# Patient Record
Sex: FEMALE | Race: WHITE | HISPANIC OR LATINO | URBAN - METROPOLITAN AREA
[De-identification: names, ages, dates, MRNs, and addresses within clinical notes are randomized per-mention and may not be internally consistent; named-entity substitution may affect disease eponyms.]

---

## 2022-07-20 ENCOUNTER — LAB OUTSIDE AN ENCOUNTER (OUTPATIENT)
Dept: URBAN - METROPOLITAN AREA CLINIC 105 | Facility: CLINIC | Age: 52
End: 2022-07-20

## 2022-07-20 ENCOUNTER — WEB ENCOUNTER (OUTPATIENT)
Dept: URBAN - METROPOLITAN AREA CLINIC 105 | Facility: CLINIC | Age: 52
End: 2022-07-20

## 2022-07-20 ENCOUNTER — OFFICE VISIT (OUTPATIENT)
Dept: URBAN - METROPOLITAN AREA CLINIC 105 | Facility: CLINIC | Age: 52
End: 2022-07-20
Payer: COMMERCIAL

## 2022-07-20 VITALS
SYSTOLIC BLOOD PRESSURE: 134 MMHG | WEIGHT: 209.8 LBS | HEART RATE: 82 BPM | BODY MASS INDEX: 41.19 KG/M2 | DIASTOLIC BLOOD PRESSURE: 84 MMHG | TEMPERATURE: 97.9 F | HEIGHT: 60 IN

## 2022-07-20 DIAGNOSIS — K62.5 RECTAL BLEEDING: ICD-10-CM

## 2022-07-20 DIAGNOSIS — R19.7 DIARRHEA, UNSPECIFIED TYPE: ICD-10-CM

## 2022-07-20 DIAGNOSIS — R15.9 INCONTINENCE OF FECES, UNSPECIFIED FECAL INCONTINENCE TYPE: ICD-10-CM

## 2022-07-20 DIAGNOSIS — K44.9 HIATAL HERNIA: ICD-10-CM

## 2022-07-20 DIAGNOSIS — R10.84 GENERALIZED ABDOMINAL PAIN: ICD-10-CM

## 2022-07-20 DIAGNOSIS — Z87.11 HISTORY OF GASTRIC ULCER: ICD-10-CM

## 2022-07-20 DIAGNOSIS — E66.01 MORBID OBESITY: ICD-10-CM

## 2022-07-20 PROCEDURE — 99214 OFFICE O/P EST MOD 30 MIN: CPT | Performed by: INTERNAL MEDICINE

## 2022-07-20 NOTE — HPI-TODAY'S VISIT:
The patient presents for rectal bleeding.  On 11/14/19, the patient presented for fecal incontinence and rectal bleeding. Onset was 13 years after she had her baby. She said her baby was big and she was not supposed to give birth vaginally, but ended up doing that. She said the doctor did not suture up her vaginal region properly, so she started to experience incontinence and rectal bleeding. She reported that her incontinence could be painful. In 2018, a vaginal repair (Dr. Cristopher Landeros in [place]) was done, which improved her urinary incontinence.  She noted fecal incontinence 4x/week. For example, when she walked, she could feel rectal burning and stool/blood came out. Stool mainly came out, but occasionally blood came out.   She had 3-6 BMs/day. She noted no formation and it was soft on Miralax cap 4x/week. She had a BM after she ate. She had watery diarrhea yesterday and the day before. She got watery diarrhea 2x/month. She had a history of a HH, a bleeding gastric ulcer, IBS, alternating bouts of diarrhea and constipation, and hemorrhoids all since the age of 16.   She previously had repeated UTIs. She had 4-5 UTIs/year. She had seen a GI previously for her ulcers. She said her bladder had been raised.  She was seen in 2009 by Dr. Douglass for rectal bleeding/constipation/bloating.  Colonoscopy 3 years prior was noted by Dr. Douglass and was normal.  Hydrocortisone supp, Align, and Colace recommended with a 4 week follow-up.  She did not follow-up.  HPI: Today, the patient says she had vaginal reconstructive surgery 7 years ago due to uterine prolapse as a result of a vaginal delivery 16 years prior. She has had issues with urinary incontinence and currently has fecal incontinence and hemorrhoidal symptoms. She has rectal bleeding, fecal urgency, pain at the rectum, abdominal pain. Constipation is not as often as diarrhea. Sometimes she feels like something is "scratching" her intestines. After her last appt in 2019, she no longer had insurance, but is now covered - EGD and Colonoscopy ordered at that time along with labs.    She saw Dr. Gannon on 11/21/19 for fecal incontinence.  He noted first degree hemorrhoids on anoscopy.  Her ordered an anorectal manometry and anal US.  He discussed with her possible therapy including PT/biofeedback, sacral nerve stimulator, or sphincteroplasty.  She notes fiber supplements worsened her constipation previously.

## 2022-07-21 LAB
A/G RATIO: 1.6
ABSOLUTE BASOPHILS: 18
ABSOLUTE EOSINOPHILS: 100
ABSOLUTE LYMPHOCYTES: 2018
ABSOLUTE MONOCYTES: 378
ABSOLUTE NEUTROPHILS: 3387
ALBUMIN: 4.1
ALKALINE PHOSPHATASE: 89
ALT (SGPT): 27
AST (SGOT): 21
BASOPHILS: 0.3
BILIRUBIN, TOTAL: 0.3
BUN/CREATININE RATIO: (no result)
BUN: 15
CALCIUM: 9.5
CARBON DIOXIDE, TOTAL: 33
CHLORIDE: 101
CREATININE: 0.76
EGFR: 95
EOSINOPHILS: 1.7
GLOBULIN, TOTAL: 2.5
GLUCOSE: 98
HEMATOCRIT: 39.8
HEMOGLOBIN: 13.3
LYMPHOCYTES: 34.2
MCH: 30.4
MCHC: 33.4
MCV: 90.9
MONOCYTES: 6.4
MPV: 11.5
NEUTROPHILS: 57.4
PLATELET COUNT: 189
POTASSIUM: 4
PROTEIN, TOTAL: 6.6
RDW: 12.5
RED BLOOD CELL COUNT: 4.38
SODIUM: 141
TSH W/REFLEX TO FT4: 2.2
WHITE BLOOD CELL COUNT: 5.9

## 2022-07-23 PROBLEM — 162864005: Status: ACTIVE | Noted: 2022-07-20

## 2022-08-16 ENCOUNTER — TELEPHONE ENCOUNTER (OUTPATIENT)
Dept: URBAN - METROPOLITAN AREA CLINIC 105 | Facility: CLINIC | Age: 52
End: 2022-08-16

## 2022-09-06 ENCOUNTER — OFFICE VISIT (OUTPATIENT)
Dept: URBAN - METROPOLITAN AREA SURGERY CENTER 13 | Facility: SURGERY CENTER | Age: 52
End: 2022-09-06

## 2022-09-06 ENCOUNTER — OFFICE VISIT (OUTPATIENT)
Dept: URBAN - METROPOLITAN AREA MEDICAL CENTER 33 | Facility: MEDICAL CENTER | Age: 52
End: 2022-09-06

## 2022-09-13 ENCOUNTER — OFFICE VISIT (OUTPATIENT)
Dept: URBAN - METROPOLITAN AREA SURGERY CENTER 31 | Facility: SURGERY CENTER | Age: 52
End: 2022-09-13
Payer: COMMERCIAL

## 2022-09-13 ENCOUNTER — TELEPHONE ENCOUNTER (OUTPATIENT)
Dept: URBAN - METROPOLITAN AREA CLINIC 105 | Facility: CLINIC | Age: 52
End: 2022-09-13

## 2022-09-13 ENCOUNTER — OFFICE VISIT (OUTPATIENT)
Dept: URBAN - METROPOLITAN AREA SURGERY CENTER 13 | Facility: SURGERY CENTER | Age: 52
End: 2022-09-13

## 2022-09-13 ENCOUNTER — TELEPHONE ENCOUNTER (OUTPATIENT)
Dept: URBAN - METROPOLITAN AREA CLINIC 128 | Facility: CLINIC | Age: 52
End: 2022-09-13

## 2022-09-13 ENCOUNTER — CLAIMS CREATED FROM THE CLAIM WINDOW (OUTPATIENT)
Dept: URBAN - METROPOLITAN AREA CLINIC 4 | Facility: CLINIC | Age: 52
End: 2022-09-13
Payer: COMMERCIAL

## 2022-09-13 DIAGNOSIS — K22.89 DILATATION OF ESOPHAGUS: ICD-10-CM

## 2022-09-13 DIAGNOSIS — R19.7 ACUTE DIARRHEA: ICD-10-CM

## 2022-09-13 DIAGNOSIS — K92.1 ACUTE MELENA: ICD-10-CM

## 2022-09-13 DIAGNOSIS — K31.A11 GASTRIC INTESTINAL METAPLASIA WITHOUT DYSPLASIA, INVOLVING THE ANTRUM: ICD-10-CM

## 2022-09-13 DIAGNOSIS — K31.89 OTHER DISEASES OF STOMACH AND DUODENUM: ICD-10-CM

## 2022-09-13 DIAGNOSIS — K63.5 BENIGN COLON POLYP: ICD-10-CM

## 2022-09-13 DIAGNOSIS — R10.84 ABDOMINAL CRAMPING, GENERALIZED: ICD-10-CM

## 2022-09-13 DIAGNOSIS — K63.89 OTHER SPECIFIED DISEASES OF INTESTINE: ICD-10-CM

## 2022-09-13 DIAGNOSIS — K51.40 INFLAMMATORY POLYPS OF COLON WITHOUT COMPLICATIONS: ICD-10-CM

## 2022-09-13 PROCEDURE — 45385 COLONOSCOPY W/LESION REMOVAL: CPT | Performed by: INTERNAL MEDICINE

## 2022-09-13 PROCEDURE — G8907 PT DOC NO EVENTS ON DISCHARG: HCPCS | Performed by: INTERNAL MEDICINE

## 2022-09-13 PROCEDURE — 88342 IMHCHEM/IMCYTCHM 1ST ANTB: CPT | Performed by: PATHOLOGY

## 2022-09-13 PROCEDURE — 43239 EGD BIOPSY SINGLE/MULTIPLE: CPT | Performed by: INTERNAL MEDICINE

## 2022-09-13 PROCEDURE — 88312 SPECIAL STAINS GROUP 1: CPT | Performed by: PATHOLOGY

## 2022-09-13 PROCEDURE — 45380 COLONOSCOPY AND BIOPSY: CPT | Performed by: INTERNAL MEDICINE

## 2022-09-13 PROCEDURE — 88305 TISSUE EXAM BY PATHOLOGIST: CPT | Performed by: PATHOLOGY

## 2022-09-16 ENCOUNTER — TELEPHONE ENCOUNTER (OUTPATIENT)
Dept: URBAN - METROPOLITAN AREA CLINIC 105 | Facility: CLINIC | Age: 52
End: 2022-09-16

## 2022-09-16 ENCOUNTER — TELEPHONE ENCOUNTER (OUTPATIENT)
Dept: URBAN - METROPOLITAN AREA CLINIC 128 | Facility: CLINIC | Age: 52
End: 2022-09-16

## 2022-09-16 RX ORDER — HYOSCYAMINE SULFATE 0.12 MG/1
1 TABLET TABLET SUBLINGUAL
Qty: 30 | Refills: 2 | OUTPATIENT
Start: 2022-09-16 | End: 2022-12-14

## 2022-09-21 ENCOUNTER — OFFICE VISIT (OUTPATIENT)
Dept: URBAN - METROPOLITAN AREA CLINIC 105 | Facility: CLINIC | Age: 52
End: 2022-09-21
Payer: COMMERCIAL

## 2022-09-21 VITALS
DIASTOLIC BLOOD PRESSURE: 80 MMHG | HEIGHT: 60 IN | WEIGHT: 212.8 LBS | TEMPERATURE: 97.3 F | BODY MASS INDEX: 41.78 KG/M2 | SYSTOLIC BLOOD PRESSURE: 136 MMHG | HEART RATE: 74 BPM

## 2022-09-21 DIAGNOSIS — K29.00 ACUTE SUPERFICIAL GASTRITIS WITHOUT HEMORRHAGE: ICD-10-CM

## 2022-09-21 DIAGNOSIS — R15.9 INCONTINENCE OF FECES, UNSPECIFIED FECAL INCONTINENCE TYPE: ICD-10-CM

## 2022-09-21 DIAGNOSIS — K44.9 HIATAL HERNIA: ICD-10-CM

## 2022-09-21 DIAGNOSIS — R10.2 SUPRAPUBIC ABDOMINAL PAIN: ICD-10-CM

## 2022-09-21 DIAGNOSIS — Z87.11 HISTORY OF GASTRIC ULCER: ICD-10-CM

## 2022-09-21 DIAGNOSIS — K22.9 NODULE OF ESOPHAGUS: ICD-10-CM

## 2022-09-21 DIAGNOSIS — R10.84 GENERALIZED ABDOMINAL PAIN: ICD-10-CM

## 2022-09-21 DIAGNOSIS — R19.7 DIARRHEA, UNSPECIFIED TYPE: ICD-10-CM

## 2022-09-21 DIAGNOSIS — E66.01 MORBID OBESITY: ICD-10-CM

## 2022-09-21 DIAGNOSIS — K64.8 INTERNAL BLEEDING HEMORRHOIDS: ICD-10-CM

## 2022-09-21 PROCEDURE — 99214 OFFICE O/P EST MOD 30 MIN: CPT | Performed by: INTERNAL MEDICINE

## 2022-09-21 RX ORDER — OMEPRAZOLE 40 MG/1
1 CAPSULE 30 MINUTES BEFORE MORNING MEAL CAPSULE, DELAYED RELEASE ORAL ONCE A DAY
Qty: 30 | Refills: 2 | OUTPATIENT
Start: 2022-09-21

## 2022-09-21 RX ORDER — HYOSCYAMINE SULFATE 0.12 MG/1
1 TABLET TABLET SUBLINGUAL
Qty: 30 | Refills: 2 | Status: ON HOLD | COMMUNITY
Start: 2022-09-16 | End: 2022-12-14

## 2022-09-21 NOTE — HPI-TODAY'S VISIT:
The patient presents for rectal bleeding.  On 11/14/19, the patient presented for fecal incontinence and rectal bleeding. Onset was 13 years after she had her baby. She said her baby was big and she was not supposed to give birth vaginally, but ended up doing that. She said the doctor did not suture up her vaginal region properly, so she started to experience incontinence and rectal bleeding. She reported that her incontinence could be painful. In 2018, a vaginal repair (Dr. Cristopher Landeros in [place]) was done, which improved her urinary incontinence.  She noted fecal incontinence 4x/week. For example, when she walked, she could feel rectal burning and stool/blood came out. Stool mainly came out, but occasionally blood came out.   She had 3-6 BMs/day. She noted no formation and it was soft on Miralax cap 4x/week. She had a BM after she ate. She had watery diarrhea yesterday and the day before. She got watery diarrhea 2x/month. She had a history of a HH, a bleeding gastric ulcer, IBS, alternating bouts of diarrhea and constipation, and hemorrhoids all since the age of 16.   She previously had repeated UTIs. She had 4-5 UTIs/year. She had seen a GI previously for her ulcers. She said her bladder had been raised.  She was seen in 2009 by Dr. Douglass for rectal bleeding/constipation/bloating.  Colonoscopy 3 years prior was noted by Dr. Douglass and was normal.  Hydrocortisone supp, Align, and Colace recommended with a 4 week follow-up.  She did not follow-up.  On 7/20/22, the patient said she had vaginal reconstructive surgery 7 years ago due to uterine prolapse as a result of a vaginal delivery 16 years prior. She had issues with urinary incontinence and currently had fecal incontinence and hemorrhoidal symptoms. She had rectal bleeding, fecal urgency, pain at the rectum, abdominal pain. Constipation was not as often as diarrhea. Sometimes she felt like something was "scratching" her intestines. After her last appt in 2019, she no longer had insurance, but was now covered - EGD and Colonoscopy ordered at that time along with labs.    She saw Dr. Gannon on 11/21/19 for fecal incontinence.  He noted first degree hemorrhoids on anoscopy.  He ordered an anorectal manometry and anal US.  He discussed with her possible therapy including PT/biofeedback, sacral nerve stimulator, or sphincteroplasty.  She noted fiber supplements worsened her constipation previously.  HPI: Today, she says she started to have severe bilateral suprapubic pain 20 hours after her EGD/colon that lasted 3-4 days. Pain is now less severe - has it 6x/day and lasts minutes. She feels like she has gastritis. She last took hyoscyamine 2 days ago which helped. She has 5 BMs QD - not taking anything for her bowel habit. She felt a burning sensation while urinating - labs with PCP were negative. She is unsure if her  has made the appt with Dr. Gannon.  Labs 7/20/22 - CBC, CMP, TSH/FT4 all normal.

## 2022-09-27 ENCOUNTER — OFFICE VISIT (OUTPATIENT)
Dept: URBAN - METROPOLITAN AREA MEDICAL CENTER 33 | Facility: MEDICAL CENTER | Age: 52
End: 2022-09-27

## 2022-10-12 ENCOUNTER — TELEPHONE ENCOUNTER (OUTPATIENT)
Dept: URBAN - METROPOLITAN AREA CLINIC 105 | Facility: CLINIC | Age: 52
End: 2022-10-12

## 2022-10-31 ENCOUNTER — OFFICE VISIT (OUTPATIENT)
Dept: URBAN - METROPOLITAN AREA SURGERY CENTER 31 | Facility: SURGERY CENTER | Age: 52
End: 2022-10-31
Payer: COMMERCIAL

## 2022-10-31 ENCOUNTER — CLAIMS CREATED FROM THE CLAIM WINDOW (OUTPATIENT)
Dept: URBAN - METROPOLITAN AREA CLINIC 4 | Facility: CLINIC | Age: 52
End: 2022-10-31
Payer: COMMERCIAL

## 2022-10-31 DIAGNOSIS — K31.89 OTHER DISEASES OF STOMACH AND DUODENUM: ICD-10-CM

## 2022-10-31 DIAGNOSIS — K31.89 ACQUIRED DEFORMITY OF DUODENUM: ICD-10-CM

## 2022-10-31 DIAGNOSIS — K21.9 ACID REFLUX: ICD-10-CM

## 2022-10-31 PROCEDURE — 43239 EGD BIOPSY SINGLE/MULTIPLE: CPT | Performed by: INTERNAL MEDICINE

## 2022-10-31 PROCEDURE — 88305 TISSUE EXAM BY PATHOLOGIST: CPT | Performed by: PATHOLOGY

## 2022-10-31 PROCEDURE — 88312 SPECIAL STAINS GROUP 1: CPT | Performed by: PATHOLOGY

## 2022-10-31 PROCEDURE — G8907 PT DOC NO EVENTS ON DISCHARG: HCPCS | Performed by: INTERNAL MEDICINE

## 2022-10-31 RX ORDER — OMEPRAZOLE 40 MG/1
1 CAPSULE 30 MINUTES BEFORE MORNING MEAL CAPSULE, DELAYED RELEASE ORAL ONCE A DAY
Qty: 30 | Refills: 2 | Status: ACTIVE | COMMUNITY
Start: 2022-09-21

## 2022-10-31 RX ORDER — OMEPRAZOLE 40 MG/1
1 CAPSULE 30 MINUTES BEFORE MORNING MEAL CAPSULE, DELAYED RELEASE ORAL ONCE A DAY
Qty: 90 CAPSULE | Refills: 3
Start: 2022-09-21

## 2022-10-31 RX ORDER — HYOSCYAMINE SULFATE 0.12 MG/1
1 TABLET TABLET SUBLINGUAL
Qty: 30 | Refills: 2 | Status: ON HOLD | COMMUNITY
Start: 2022-09-16 | End: 2022-12-14

## 2022-11-14 ENCOUNTER — OFFICE VISIT (OUTPATIENT)
Dept: URBAN - METROPOLITAN AREA SURGERY CENTER 31 | Facility: SURGERY CENTER | Age: 52
End: 2022-11-14

## 2022-12-20 ENCOUNTER — OFFICE VISIT (OUTPATIENT)
Dept: URBAN - METROPOLITAN AREA CLINIC 105 | Facility: CLINIC | Age: 52
End: 2022-12-20
Payer: COMMERCIAL

## 2022-12-20 VITALS
DIASTOLIC BLOOD PRESSURE: 85 MMHG | TEMPERATURE: 97.5 F | HEIGHT: 60 IN | SYSTOLIC BLOOD PRESSURE: 124 MMHG | BODY MASS INDEX: 42.01 KG/M2 | HEART RATE: 80 BPM | WEIGHT: 214 LBS

## 2022-12-20 DIAGNOSIS — R15.9 INCONTINENCE OF FECES, UNSPECIFIED FECAL INCONTINENCE TYPE: ICD-10-CM

## 2022-12-20 DIAGNOSIS — R19.7 DIARRHEA, UNSPECIFIED TYPE: ICD-10-CM

## 2022-12-20 DIAGNOSIS — K29.00 ACUTE SUPERFICIAL GASTRITIS WITHOUT HEMORRHAGE: ICD-10-CM

## 2022-12-20 DIAGNOSIS — K21.00 GASTROESOPHAGEAL REFLUX DISEASE WITH ESOPHAGITIS WITHOUT HEMORRHAGE: ICD-10-CM

## 2022-12-20 DIAGNOSIS — K44.9 HIATAL HERNIA: ICD-10-CM

## 2022-12-20 DIAGNOSIS — E66.01 MORBID OBESITY: ICD-10-CM

## 2022-12-20 DIAGNOSIS — K64.8 INTERNAL BLEEDING HEMORRHOIDS: ICD-10-CM

## 2022-12-20 DIAGNOSIS — R10.84 GENERALIZED ABDOMINAL PAIN: ICD-10-CM

## 2022-12-20 DIAGNOSIS — Z87.11 HISTORY OF GASTRIC ULCER: ICD-10-CM

## 2022-12-20 DIAGNOSIS — R10.2 SUPRAPUBIC ABDOMINAL PAIN: ICD-10-CM

## 2022-12-20 PROCEDURE — 99214 OFFICE O/P EST MOD 30 MIN: CPT | Performed by: INTERNAL MEDICINE

## 2022-12-20 RX ORDER — AMITRIPTYLINE HYDROCHLORIDE 10 MG/1
1 TABLET TABLET, FILM COATED ORAL
Qty: 30 | Refills: 2 | OUTPATIENT
Start: 2022-12-20

## 2022-12-20 RX ORDER — OMEPRAZOLE 40 MG/1
1 CAPSULE CAPSULE, DELAYED RELEASE ORAL
Qty: 30 | Refills: 5 | OUTPATIENT
Start: 2022-09-21

## 2022-12-20 RX ORDER — HYOSCYAMINE SULFATE 0.12 MG/1
1 TABLET TABLET SUBLINGUAL
Qty: 30 | Refills: 2 | OUTPATIENT
Start: 2022-12-20 | End: 2023-03-20

## 2022-12-20 RX ORDER — OMEPRAZOLE 40 MG/1
1 CAPSULE 30 MINUTES BEFORE MORNING MEAL CAPSULE, DELAYED RELEASE ORAL ONCE A DAY
Qty: 90 CAPSULE | Refills: 3 | Status: ACTIVE | COMMUNITY
Start: 2022-09-21

## 2022-12-20 NOTE — HPI-TODAY'S VISIT:
The patient presents for rectal bleeding.  On 11/14/19, the patient presented for fecal incontinence and rectal bleeding. Onset was 13 years after she had her baby. She said her baby was big and she was not supposed to give birth vaginally, but ended up doing that. She said the doctor did not suture up her vaginal region properly, so she started to experience incontinence and rectal bleeding. She reported that her incontinence could be painful. In 2018, a vaginal repair (Dr. Cristopher Landeros in [place]) was done, which improved her urinary incontinence.  She noted fecal incontinence 4x/week. For example, when she walked, she could feel rectal burning and stool/blood came out. Stool mainly came out, but occasionally blood came out.   She had 3-6 BMs/day. She noted no formation and it was soft on Miralax cap 4x/week. She had a BM after she ate. She had watery diarrhea yesterday and the day before. She got watery diarrhea 2x/month. She had a history of a HH, a bleeding gastric ulcer, IBS, alternating bouts of diarrhea and constipation, and hemorrhoids all since the age of 16.   She previously had repeated UTIs. She had 4-5 UTIs/year. She had seen a GI previously for her ulcers. She said her bladder had been raised.  She was seen in 2009 by Dr. Douglass for rectal bleeding/constipation/bloating.  Colonoscopy 3 years prior was noted by Dr. Douglass and was normal.  Hydrocortisone supp, Align, and Colace recommended with a 4 week follow-up.  She did not follow-up.  On 7/20/22, the patient said she had vaginal reconstructive surgery 7 years ago due to uterine prolapse as a result of a vaginal delivery 16 years prior. She had issues with urinary incontinence and currently had fecal incontinence and hemorrhoidal symptoms. She had rectal bleeding, fecal urgency, pain at the rectum, abdominal pain. Constipation was not as often as diarrhea. Sometimes she felt like something was "scratching" her intestines. After her last appt in 2019, she no longer had insurance, but was now covered - EGD and Colonoscopy ordered at that time along with labs.    She saw Dr. Gannon on 11/21/19 for fecal incontinence.  He noted first degree hemorrhoids on anoscopy.  He ordered an anorectal manometry and anal US.  He discussed with her possible therapy including PT/biofeedback, sacral nerve stimulator, or sphincteroplasty.  She noted fiber supplements worsened her constipation previously.  On 9/21/22, she said she started to have severe bilateral suprapubic pain 20 hours after her EGD/colon that lasted 3-4 days. Pain was now less severe - had it 6x/day and lasted minutes. She felt like she had gastritis. She last took hyoscyamine 2 days ago which helped. She had 5 BMs QD - not taking anything for her bowel habit. She felt a burning sensation while urinating - labs with PCP were negative. She was unsure if her  had made the appt with Dr. Gannon.  HPI: Today, she says she started on omeprazole, but d/c - was taking abx which she has also stopped taking. She is not having much abdominal pain, only leg/feet pain. She took hyoscyamine a few times. She notes urgency with a BM.  Labs 7/20/22 - CBC, CMP, TSH/FT4 all normal.

## 2022-12-21 ENCOUNTER — DASHBOARD ENCOUNTERS (OUTPATIENT)
Age: 52
End: 2022-12-21

## 2022-12-21 PROBLEM — 72042002: Status: ACTIVE | Noted: 2022-07-20

## 2022-12-21 PROBLEM — 238136002: Status: ACTIVE | Noted: 2022-07-23

## 2022-12-21 PROBLEM — 266433003: Status: ACTIVE | Noted: 2022-12-20

## 2023-02-22 ENCOUNTER — OFFICE VISIT (OUTPATIENT)
Dept: URBAN - METROPOLITAN AREA CLINIC 105 | Facility: CLINIC | Age: 53
End: 2023-02-22

## 2023-03-14 ENCOUNTER — APPOINTMENT (RX ONLY)
Dept: URBAN - METROPOLITAN AREA CLINIC 44 | Facility: CLINIC | Age: 53
Setting detail: DERMATOLOGY
End: 2023-03-14

## 2023-03-14 DIAGNOSIS — S0032XA BLISTER OF FACE, NECK, AND SCALP EXCEPT EYE, WITHOUT MENTION OF INFECTION: ICD-10-CM

## 2023-03-14 DIAGNOSIS — S0002XA BLISTER OF FACE, NECK, AND SCALP EXCEPT EYE, WITHOUT MENTION OF INFECTION: ICD-10-CM

## 2023-03-14 DIAGNOSIS — S00429A BLISTER OF FACE, NECK, AND SCALP EXCEPT EYE, WITHOUT MENTION OF INFECTION: ICD-10-CM

## 2023-03-14 DIAGNOSIS — S00522A BLISTER OF FACE, NECK, AND SCALP EXCEPT EYE, WITHOUT MENTION OF INFECTION: ICD-10-CM

## 2023-03-14 DIAGNOSIS — S1092XA BLISTER OF FACE, NECK, AND SCALP EXCEPT EYE, WITHOUT MENTION OF INFECTION: ICD-10-CM

## 2023-03-14 DIAGNOSIS — S00521A BLISTER OF FACE, NECK, AND SCALP EXCEPT EYE, WITHOUT MENTION OF INFECTION: ICD-10-CM

## 2023-03-14 DIAGNOSIS — S0092XA BLISTER OF FACE, NECK, AND SCALP EXCEPT EYE, WITHOUT MENTION OF INFECTION: ICD-10-CM

## 2023-03-14 DIAGNOSIS — B07.8 OTHER VIRAL WARTS: ICD-10-CM

## 2023-03-14 DIAGNOSIS — S1012XA BLISTER OF FACE, NECK, AND SCALP EXCEPT EYE, WITHOUT MENTION OF INFECTION: ICD-10-CM

## 2023-03-14 PROBLEM — S40.822A BLISTER (NONTHERMAL) OF LEFT UPPER ARM, INITIAL ENCOUNTER: Status: ACTIVE | Noted: 2023-03-14

## 2023-03-14 PROCEDURE — ? COUNSELING

## 2023-03-14 PROCEDURE — ? MEDICATION COUNSELING

## 2023-03-14 PROCEDURE — ? ADDITIONAL NOTES

## 2023-03-14 PROCEDURE — ? LIQUID NITROGEN

## 2023-03-14 PROCEDURE — ? PRESCRIPTION MEDICATION MANAGEMENT

## 2023-03-14 PROCEDURE — 17110 DESTRUCTION B9 LES UP TO 14: CPT

## 2023-03-14 PROCEDURE — ? PRESCRIPTION

## 2023-03-14 PROCEDURE — 99202 OFFICE O/P NEW SF 15 MIN: CPT | Mod: 25

## 2023-03-14 RX ORDER — PHARMACY COMPOUNDING ACCESSORY
EACH MISCELLANEOUS QHS
Qty: 35 | Refills: 1 | Status: ERX | COMMUNITY
Start: 2023-03-14

## 2023-03-14 RX ADMIN — Medication: at 00:00

## 2023-03-14 ASSESSMENT — LOCATION DETAILED DESCRIPTION DERM
LOCATION DETAILED: LEFT LATERAL PROXIMAL UPPER ARM
LOCATION DETAILED: LEFT LATERAL PROXIMAL UPPER ARM
LOCATION DETAILED: RIGHT DISTAL VENTRAL THUMB
LOCATION DETAILED: RIGHT DISTAL RADIAL THUMB

## 2023-03-14 ASSESSMENT — LOCATION SIMPLE DESCRIPTION DERM
LOCATION SIMPLE: RIGHT THUMB
LOCATION SIMPLE: LEFT UPPER ARM
LOCATION SIMPLE: LEFT UPPER ARM

## 2023-03-14 ASSESSMENT — LOCATION ZONE DERM
LOCATION ZONE: ARM
LOCATION ZONE: ARM
LOCATION ZONE: FINGER

## 2023-03-14 NOTE — PROCEDURE: LIQUID NITROGEN
Show Applicator Variable?: Yes
Render Note In Bullet Format When Appropriate: No
Detail Level: Detailed
Consent: The patient's consent was obtained including but not limited to risks of crusting, scabbing, blistering, scarring, darker or lighter pigmentary change, recurrence, incomplete removal and infection.
Spray Paint Text: The liquid nitrogen was applied to the skin utilizing a spray paint frosting technique.
Post-Care Instructions: I reviewed with the patient in detail post-care instructions. Patient is to wear sunprotection, and avoid picking at any of the treated lesions. Pt may apply Vaseline to crusted or scabbing areas.
Medical Necessity Clause: This procedure was medically necessary because the lesions that were treated were: irritating to patient
Medical Necessity Information: It is in your best interest to select a reason for this procedure from the list below. All of these items fulfill various CMS LCD requirements except the new and changing color options.

## 2023-03-14 NOTE — HPI: WART (PATIENT REPORTED)
Where Is Your Wart Located?: Left 1st digit
Additional Comments (Use Complete Sentences): New pt here for wart on left hand and spot on right arm

## 2023-03-14 NOTE — PROCEDURE: MEDICATION COUNSELING
The patient is a 6m4w Female complaining of vomiting. Taltz Counseling: I discussed with the patient the risks of ixekizumab including but not limited to immunosuppression, serious infections, worsening of inflammatory bowel disease and drug reactions.  The patient understands that monitoring is required including a PPD at baseline and must alert us or the primary physician if symptoms of infection or other concerning signs are noted.

## 2023-03-14 NOTE — PROCEDURE: PRESCRIPTION MEDICATION MANAGEMENT
Detail Level: Zone
Initiate Treatment: pharmacy compounding accessory Qhs\\nQuantity: 35.0 ml  Days Supply: 30\\nSig: Fluorouracil 5%, Cimetidine 5%, salicylic Acid 20% cream Apply very small amount qhs to wart on aa of the hands. Cover with a band aid
Render In Strict Bullet Format?: No

## 2023-03-14 NOTE — PROCEDURE: MEDICATION COUNSELING
Xelkyreez Pregnancy And Lactation Text: This medication is Pregnancy Category D and is not considered safe during pregnancy.  The risk during breast feeding is also uncertain.

## 2023-03-29 ENCOUNTER — APPOINTMENT (RX ONLY)
Dept: URBAN - METROPOLITAN AREA CLINIC 44 | Facility: CLINIC | Age: 53
Setting detail: DERMATOLOGY
End: 2023-03-29

## 2023-03-29 DIAGNOSIS — B07.8 OTHER VIRAL WARTS: ICD-10-CM

## 2023-03-29 PROCEDURE — ? PRESCRIPTION MEDICATION MANAGEMENT

## 2023-03-29 PROCEDURE — ? LIQUID NITROGEN

## 2023-03-29 PROCEDURE — ? FULL BODY SKIN EXAM - DECLINED

## 2023-03-29 PROCEDURE — ? ADDITIONAL NOTES

## 2023-03-29 PROCEDURE — ? COUNSELING

## 2023-03-29 PROCEDURE — 17110 DESTRUCTION B9 LES UP TO 14: CPT

## 2023-03-29 ASSESSMENT — LOCATION DETAILED DESCRIPTION DERM
LOCATION DETAILED: RIGHT DISTAL RADIAL THUMB
LOCATION DETAILED: LEFT DISTAL RADIAL PALMAR INDEX FINGER
LOCATION DETAILED: RIGHT DISTAL VENTRAL THUMB

## 2023-03-29 ASSESSMENT — LOCATION SIMPLE DESCRIPTION DERM
LOCATION SIMPLE: LEFT INDEX FINGER
LOCATION SIMPLE: RIGHT THUMB

## 2023-03-29 ASSESSMENT — LOCATION ZONE DERM: LOCATION ZONE: FINGER

## 2023-03-29 NOTE — PROCEDURE: PRESCRIPTION MEDICATION MANAGEMENT
Render In Strict Bullet Format?: No
Continue Regimen: pharmacy compounding accessory Miscellaneous\\nSig: Fluorouracil 5%, Cimetidine 5%, salicylic Acid 20% cream Apply very small amount qhs to wart on aa of the\\nhands. Cover with a band aid\\nQuantity: 35 Milliliter Refills: 1
Detail Level: Zone

## 2023-03-29 NOTE — PROCEDURE: MIPS QUALITY
Yes
Quality 130: Documentation Of Current Medications In The Medical Record: Current Medications Documented
Detail Level: Detailed
Quality 431: Preventive Care And Screening: Unhealthy Alcohol Use - Screening: Patient not identified as an unhealthy alcohol user when screened for unhealthy alcohol use using a systematic screening method
Quality 226: Preventive Care And Screening: Tobacco Use: Screening And Cessation Intervention: Patient screened for tobacco use and is an ex/non-smoker

## 2023-07-26 ENCOUNTER — OFFICE VISIT (OUTPATIENT)
Dept: URBAN - METROPOLITAN AREA CLINIC 105 | Facility: CLINIC | Age: 53
End: 2023-07-26

## 2025-08-26 ENCOUNTER — OFFICE VISIT (OUTPATIENT)
Dept: URBAN - METROPOLITAN AREA CLINIC 105 | Facility: CLINIC | Age: 55
End: 2025-08-26
Payer: COMMERCIAL

## 2025-08-26 ENCOUNTER — LAB OUTSIDE AN ENCOUNTER (OUTPATIENT)
Dept: URBAN - METROPOLITAN AREA CLINIC 105 | Facility: CLINIC | Age: 55
End: 2025-08-26

## 2025-08-26 DIAGNOSIS — R19.7 DIARRHEA, UNSPECIFIED TYPE: ICD-10-CM

## 2025-08-26 DIAGNOSIS — K64.8 INTERNAL BLEEDING HEMORRHOIDS: ICD-10-CM

## 2025-08-26 DIAGNOSIS — R10.84 GENERALIZED ABDOMINAL PAIN: ICD-10-CM

## 2025-08-26 DIAGNOSIS — R41.3 MEMORY LOSS: ICD-10-CM

## 2025-08-26 DIAGNOSIS — K21.00 GASTROESOPHAGEAL REFLUX DISEASE WITH ESOPHAGITIS WITHOUT HEMORRHAGE: ICD-10-CM

## 2025-08-26 DIAGNOSIS — R10.2 SUPRAPUBIC ABDOMINAL PAIN: ICD-10-CM

## 2025-08-26 DIAGNOSIS — K44.9 HIATAL HERNIA: ICD-10-CM

## 2025-08-26 DIAGNOSIS — R13.19 ESOPHAGEAL DYSPHAGIA: ICD-10-CM

## 2025-08-26 DIAGNOSIS — R15.9 INCONTINENCE OF FECES, UNSPECIFIED FECAL INCONTINENCE TYPE: ICD-10-CM

## 2025-08-26 DIAGNOSIS — Z87.11 HISTORY OF GASTRIC ULCER: ICD-10-CM

## 2025-08-26 DIAGNOSIS — K29.00 ACUTE SUPERFICIAL GASTRITIS WITHOUT HEMORRHAGE: ICD-10-CM

## 2025-08-26 DIAGNOSIS — Z12.11 COLON CANCER SCREENING: ICD-10-CM

## 2025-08-26 DIAGNOSIS — E66.01 MORBID OBESITY: ICD-10-CM

## 2025-08-26 PROCEDURE — 99214 OFFICE O/P EST MOD 30 MIN: CPT | Performed by: INTERNAL MEDICINE

## 2025-08-26 RX ORDER — OMEPRAZOLE 40 MG/1
1 CAPSULE CAPSULE, DELAYED RELEASE ORAL
Qty: 30 | Refills: 5 | Status: ACTIVE | COMMUNITY
Start: 2022-09-21

## 2025-08-26 RX ORDER — OMEPRAZOLE 40 MG/1
1 CAPSULE 1/2 TO 1 HOUR BEFORE MORNING MEAL CAPSULE, DELAYED RELEASE ORAL ONCE A DAY
Qty: 90 | Refills: 3 | OUTPATIENT
Start: 2022-09-21

## 2025-08-26 RX ORDER — HYOSCYAMINE SULFATE 0.12 MG/1
1-2 TABLETS TABLET SUBLINGUAL
Qty: 30 | Refills: 2 | OUTPATIENT
Start: 2022-12-20